# Patient Record
Sex: FEMALE | Race: BLACK OR AFRICAN AMERICAN | Employment: FULL TIME | URBAN - METROPOLITAN AREA
[De-identification: names, ages, dates, MRNs, and addresses within clinical notes are randomized per-mention and may not be internally consistent; named-entity substitution may affect disease eponyms.]

---

## 2022-10-08 ENCOUNTER — HOSPITAL ENCOUNTER (EMERGENCY)
Age: 27
Discharge: HOME | End: 2022-10-08
Attending: EMERGENCY MEDICINE
Payer: COMMERCIAL

## 2022-10-08 VITALS
RESPIRATION RATE: 16 BRPM | DIASTOLIC BLOOD PRESSURE: 66 MMHG | TEMPERATURE: 98.8 F | OXYGEN SATURATION: 100 % | BODY MASS INDEX: 38.76 KG/M2 | WEIGHT: 241.2 LBS | SYSTOLIC BLOOD PRESSURE: 123 MMHG | HEIGHT: 66 IN | HEART RATE: 94 BPM

## 2022-10-08 DIAGNOSIS — J02.0 STREP PHARYNGITIS: Primary | ICD-10-CM

## 2022-10-08 LAB — S PYO AG THROAT QL: POSITIVE

## 2022-10-08 PROCEDURE — 87880 STREP A ASSAY W/OPTIC: CPT

## 2022-10-08 PROCEDURE — 99284 EMERGENCY DEPT VISIT MOD MDM: CPT

## 2022-10-08 PROCEDURE — 96372 THER/PROPH/DIAG INJ SC/IM: CPT

## 2022-10-08 PROCEDURE — 6360000002 HC RX W HCPCS: Performed by: EMERGENCY MEDICINE

## 2022-10-08 PROCEDURE — 6370000000 HC RX 637 (ALT 250 FOR IP): Performed by: EMERGENCY MEDICINE

## 2022-10-08 RX ORDER — KETOROLAC TROMETHAMINE 30 MG/ML
15 INJECTION, SOLUTION INTRAMUSCULAR; INTRAVENOUS ONCE
Status: COMPLETED | OUTPATIENT
Start: 2022-10-08 | End: 2022-10-08

## 2022-10-08 RX ORDER — AMOXICILLIN 500 MG/1
500 CAPSULE ORAL 2 TIMES DAILY
Qty: 14 CAPSULE | Refills: 0 | Status: SHIPPED | OUTPATIENT
Start: 2022-10-08 | End: 2022-10-15

## 2022-10-08 RX ORDER — AMOXICILLIN AND CLAVULANATE POTASSIUM 875; 125 MG/1; MG/1
1 TABLET, FILM COATED ORAL ONCE
Status: COMPLETED | OUTPATIENT
Start: 2022-10-08 | End: 2022-10-08

## 2022-10-08 RX ADMIN — KETOROLAC TROMETHAMINE 15 MG: 30 INJECTION, SOLUTION INTRAMUSCULAR at 19:48

## 2022-10-08 RX ADMIN — AMOXICILLIN AND CLAVULANATE POTASSIUM 1 TABLET: 875; 125 TABLET, FILM COATED ORAL at 20:41

## 2022-10-08 ASSESSMENT — PAIN SCALES - GENERAL
PAINLEVEL_OUTOF10: 5
PAINLEVEL_OUTOF10: 8
PAINLEVEL_OUTOF10: 8

## 2022-10-08 ASSESSMENT — PAIN DESCRIPTION - LOCATION
LOCATION: THROAT
LOCATION: THROAT;TEETH
LOCATION: TEETH;THROAT
LOCATION: ARM

## 2022-10-08 ASSESSMENT — PAIN DESCRIPTION - DESCRIPTORS
DESCRIPTORS: ACHING
DESCRIPTORS: DISCOMFORT
DESCRIPTORS: ACHING;SORE
DESCRIPTORS: DISCOMFORT;SORE;ACHING

## 2022-10-08 ASSESSMENT — PAIN DESCRIPTION - PAIN TYPE
TYPE: ACUTE PAIN

## 2022-10-08 ASSESSMENT — PAIN DESCRIPTION - ORIENTATION
ORIENTATION: RIGHT
ORIENTATION: LEFT;LOWER

## 2022-10-08 ASSESSMENT — PAIN - FUNCTIONAL ASSESSMENT
PAIN_FUNCTIONAL_ASSESSMENT: 0-10

## 2022-10-08 ASSESSMENT — PAIN DESCRIPTION - ONSET: ONSET: ON-GOING

## 2022-10-08 ASSESSMENT — ENCOUNTER SYMPTOMS: SORE THROAT: 1

## 2022-10-08 ASSESSMENT — PAIN DESCRIPTION - FREQUENCY: FREQUENCY: INTERMITTENT

## 2022-10-08 NOTE — ED PROVIDER NOTES
[id number]          ATTENDING PHYSICIAN NOTE       Date of evaluation: 10/8/2022    Chief Complaint     Dental Pain and Pharyngitis (Began last evening states only on one side were is having dental pain )      History of Present Illness     Martha Youssef is a 32 y.o. female who presents with chief complaint of sore throat and dental pain. Patient states has been having some intermittent left lower tooth pain for several weeks. However last night she began having sore throat mostly on the left side. 8/10 in severity. Associated with subjective fevers that improved with naproxen. Some pain with swallowing but no difficulty swallowing. No changes in her voice. Review of Systems     Review of Systems   Constitutional:  Positive for fever. HENT:  Positive for sore throat. Past Medical, Surgical, Family, and Social History         Diagnosis Date    Overweight          Procedure Laterality Date     SECTION      x3     Her family history is not on file. She reports that she has never smoked. She has never used smokeless tobacco. She reports current alcohol use. She reports that she does not use drugs. Medications     Previous Medications    NAPROXEN PO    Take by mouth as needed       Allergies     She has No Known Allergies. Physical Exam     ED Triage Vitals   Enc Vitals Group      BP 10/08/22 1931 (!) 152/81      Heart Rate 10/08/22 1931 98      Resp 10/08/22 1931 18      Temp 10/08/22 1931 98.5 °F (36.9 °C)      Temp Source 10/08/22 1931 Oral      SpO2 10/08/22 1932 100 %      Weight 10/08/22 1931 241 lb 3.2 oz (109.4 kg)      Height 10/08/22 1931 5' 6\" (1.676 m)      Head Circumference --       Peak Flow --       Pain Score --       Pain Loc --       Pain Edu? --       Excl.  in [address]? --      General:  Non-toxic, no acute distress, fully cooperative with my exam    HEENT:  NCAT, there are bilateral tonsillar exudates with very mild edema and erythema but uvula is midline and there is no evidence of a peritonsillar abscess, no trismus, no significant lymphadenopathy, there is no tenderness to percussion of the teeth and no evidence of periapical abscess. Sublingual and submandibular spaces are nontender, soft, without fullness    Neck:  Supple, trachea midline, no stridor, normal ROM    Pulmonary:   No increased work of breathing    Musculoskeletal:  Grossly intact without obvious injury or deformity    Neuro: Normal speech without dysarthria    Skin: No rashes      Diagnostic Results     EKG       RADIOLOGY:  No orders to display       LABS:   No results found for this visit on 10/08/22. ED BEDSIDE ULTRASOUND:  No results found. RECENT VITALS:  BP: 139/79, Temp: 98.8 °F (37.1 °C), Heart Rate: 95, Resp: 16, SpO2: 100 %     Procedures         ED Course     Nursing Notes, Past Medical Hx, Past Surgical Hx, Social Hx, Allergies, and Family Hx were reviewed. The patient was given the following medications:  Orders Placed This Encounter   Medications    ketorolac (TORADOL) injection 15 mg    amoxicillin (AMOXIL) 500 MG capsule     Sig: Take 1 capsule by mouth 2 times daily for 7 days     Dispense:  14 capsule     Refill:  0       CONSULTS:  None    MEDICAL DECISION MAKING / ASSESSMENT / Apurva Moy is a 32 y.o. female who presents with sore throat and dental pain. No evidence of odontogenic abscess clinically. Does have evidence of pharyngitis on exam.  No signs of peritonsillar abscess at this time. Very low suspicion for retropharyngeal abscess or epiglottitis. Rapid strep swab was sent and is positive. Will treat with amoxicillin which should cover both for strep pharyngitis and for any possible odontogenic infection that is subclinical on exam.  Return precautions were discussed and patient discharged home in good condition. Clinical Impression     1.  Strep pharyngitis        Disposition     PATIENT REFERRED TO:  [facility]  [address] [facility]        DISCHARGE MEDICATIONS:  New Prescriptions    AMOXICILLIN (AMOXIL) 500 MG CAPSULE    Take 1 capsule by mouth 2 times daily for 7 days       DISPOSITION Decision To Discharge 10/08/2022 07:40:49 PM         Shelley Lawrence MD  10/08/22 2040

## 2022-10-09 NOTE — ED NOTES
Pt. In no acute distress. Breathing easy and educated on follow up care. Ambulated out of ED.       Tameka Riggs RN  10/08/22 [id number]

## 2022-10-09 NOTE — DISCHARGE INSTRUCTIONS
Strep swab was positive. Take antibiotics as prescribed. Use ibuprofen or naproxyn for pain. Return to the ED for new or worsening symptoms.

## 2023-09-15 ENCOUNTER — HOSPITAL ENCOUNTER (EMERGENCY)
Age: 28
Discharge: HOME | End: 2023-09-15
Attending: EMERGENCY MEDICINE
Payer: COMMERCIAL

## 2023-09-15 VITALS
HEART RATE: 76 BPM | BODY MASS INDEX: 34.86 KG/M2 | SYSTOLIC BLOOD PRESSURE: 137 MMHG | OXYGEN SATURATION: 100 % | WEIGHT: 216 LBS | RESPIRATION RATE: 18 BRPM | DIASTOLIC BLOOD PRESSURE: 69 MMHG | TEMPERATURE: 98.6 F

## 2023-09-15 DIAGNOSIS — J02.9 ACUTE PHARYNGITIS, UNSPECIFIED ETIOLOGY: Primary | ICD-10-CM

## 2023-09-15 LAB
S PYO AG THROAT QL: NEGATIVE
SARS-COV-2 RDRP RESP QL NAA+PROBE: NOT DETECTED

## 2023-09-15 PROCEDURE — 87880 STREP A ASSAY W/OPTIC: CPT

## 2023-09-15 PROCEDURE — 87635 SARS-COV-2 COVID-19 AMP PRB: CPT

## 2023-09-15 PROCEDURE — 87081 CULTURE SCREEN ONLY: CPT

## 2023-09-15 PROCEDURE — 99283 EMERGENCY DEPT VISIT LOW MDM: CPT

## 2023-09-15 RX ORDER — IBUPROFEN 800 MG/1
800 TABLET ORAL
COMMUNITY
Start: 2015-07-29

## 2023-09-15 RX ORDER — ETONOGESTREL 68 MG/1
1 IMPLANT SUBCUTANEOUS ONCE
COMMUNITY

## 2023-09-15 RX ORDER — ISOTRETINOIN 40 MG/1
80 CAPSULE ORAL DAILY
COMMUNITY
Start: 2023-08-31

## 2023-09-15 RX ORDER — AMOXICILLIN 500 MG/1
500 CAPSULE ORAL 3 TIMES DAILY
Qty: 30 CAPSULE | Refills: 0 | Status: SHIPPED | OUTPATIENT
Start: 2023-09-15 | End: 2023-09-25

## 2023-09-15 ASSESSMENT — PAIN - FUNCTIONAL ASSESSMENT
PAIN_FUNCTIONAL_ASSESSMENT: ACTIVITIES ARE NOT PREVENTED
PAIN_FUNCTIONAL_ASSESSMENT: 0-10
PAIN_FUNCTIONAL_ASSESSMENT: ACTIVITIES ARE NOT PREVENTED
PAIN_FUNCTIONAL_ASSESSMENT: 0-10

## 2023-09-15 ASSESSMENT — PAIN DESCRIPTION - DESCRIPTORS: DESCRIPTORS: OTHER (COMMENT)

## 2023-09-15 ASSESSMENT — PAIN DESCRIPTION - LOCATION
LOCATION: THROAT
LOCATION: THROAT

## 2023-09-15 ASSESSMENT — PAIN SCALES - GENERAL: PAINLEVEL_OUTOF10: 4

## 2023-09-15 ASSESSMENT — PAIN DESCRIPTION - PAIN TYPE
TYPE: ACUTE PAIN
TYPE: ACUTE PAIN

## 2023-09-15 ASSESSMENT — PAIN DESCRIPTION - ONSET
ONSET: SUDDEN
ONSET: SUDDEN

## 2023-09-15 ASSESSMENT — PAIN DESCRIPTION - FREQUENCY
FREQUENCY: CONTINUOUS
FREQUENCY: CONTINUOUS

## 2023-09-15 NOTE — ED NOTES
PT presents to ED for sore throat and \"hard time swallowing. \"  Pt works at [employer] and states strep has been increasing recently.        Jose A Reyez RN  09/15/23 1955

## 2023-09-16 NOTE — ED NOTES
Patient prepared for and ready to be discharged. Patient discharged at this time in no acute distress after verbalizing understanding of discharge instructions. Patient left after receiving After Visit Summary instructions.         Nargis Harry RN  09/15/23 [id number]

## 2023-09-16 NOTE — DISCHARGE INSTRUCTIONS
Tylenol and/or motrin for pain. Fluids. Your rapid strep was negative, this may be viral. If not improving in next 2-3 days, start the amoxicillin.  Return for persistent high fever, increased trouble swallowing, trouble breathing

## 2023-09-17 LAB — S PYO THROAT QL CULT: NORMAL

## 2023-09-18 LAB — S PYO THROAT QL CULT: NORMAL
